# Patient Record
(demographics unavailable — no encounter records)

---

## 2024-10-17 NOTE — PHYSICAL EXAM
[Chaperone Present] : A chaperone was present in the examining room during all aspects of the physical examination [41726] : A chaperone was present during the pelvic exam. [Appropriately responsive] : appropriately responsive [Alert] : alert [No Acute Distress] : no acute distress [No Lymphadenopathy] : no lymphadenopathy [Regular Rate Rhythm] : regular rate rhythm [No Murmurs] : no murmurs [Clear to Auscultation B/L] : clear to auscultation bilaterally [Soft] : soft [Non-tender] : non-tender [Non-distended] : non-distended [No Lesions] : no lesions [No Mass] : no mass [Oriented x3] : oriented x3 [Labia Majora] : normal [Labia Minora] : normal [IUD String] : an IUD string was noted [Uterine Adnexae] : normal [Examination Of The Breasts] : a normal appearance [Normal] : normal [No Masses] : no breast masses were palpable

## 2024-10-17 NOTE — HISTORY OF PRESENT ILLNESS
[LMP unknown] : LMP unknown [unknown] : Patient is unsure of the date of her LMP [Currently Active] : currently active [Men] : men [No] : No [Yes] : Yes [Patient refuses STI testing] : Patient refuses STI testing [FreeTextEntry1] : 33 y/o patient present for women's wellness exam. Patient has no complaints today [FreeTextEntry3] : IUD Mirena

## 2024-10-17 NOTE — PLAN
[FreeTextEntry1] : annual; pap and hpv neg last yr so not done  has iud but will remove later this yr , getting  in a few weeks extensive d/w pt that I suspect she had pcos, hx of irregular periods advised to already start inositol / berberine/  mentioned may need metformin and/ or to see KO horizon discussed and done today

## 2024-12-10 NOTE — PROCEDURE
[IUD Removal] : intrauterine device (IUD) removal [Consent Obtained] : Consent obtained [Time out performed] : Pre-procedure time out performed.  Patient's name, date of birth and procedure confirmed. [Fertility Desired] : fertility desired [Risks] : risks [Benefits] : benefits [Alternatives] : alternatives [Patient] : patient [Speculum Placed] : speculum placed [Strings Visualized] : strings visualized [IUD Discarded] : IUD discarded [Tolerated Well] : Patient tolerated the procedure well [No Complications] : no complications [Heavy Vaginal Bleeding] : for heavy vaginal bleeding [Pelvic Pain] : for pelvic pain [de-identified] : iud removed easily and intact , intact iud shown to pt before discarded